# Patient Record
Sex: MALE | Race: WHITE | NOT HISPANIC OR LATINO | URBAN - METROPOLITAN AREA
[De-identification: names, ages, dates, MRNs, and addresses within clinical notes are randomized per-mention and may not be internally consistent; named-entity substitution may affect disease eponyms.]

---

## 2023-09-15 ENCOUNTER — TELEPHONE (OUTPATIENT)
Dept: FAMILY MEDICINE | Facility: CLINIC | Age: 83
End: 2023-09-15
Payer: MEDICARE

## 2023-09-15 NOTE — TELEPHONE ENCOUNTER
Order/Referral Request  Who is requesting: Patient is requesting    Orders being requested: Patient has physical scheduled for 10/03/2023 and would like to have any/all blood work completed ahead of time so that the results are back before his appointment with [facility].    When are orders needed by: 09/27/2023    Has this been discussed with Provider: No    Does patient have an appointment scheduled?: Yes: Patient has lab appt scheduled for 09/29/2023 @ 9:45am     Where to send orders: Place orders within Epic    Okay to leave a detailed message?: Yes at Cell number on file:    Telephone Information:   Mobile [phone]

## 2023-09-18 NOTE — TELEPHONE ENCOUNTER
Left detailed message. What labs is he requesting? Dr. Rivas may require him to be seen to order labs.

## 2023-09-19 ENCOUNTER — TELEPHONE (OUTPATIENT)
Dept: FAMILY MEDICINE | Facility: CLINIC | Age: 83
End: 2023-09-19
Payer: MEDICARE

## 2023-09-19 NOTE — TELEPHONE ENCOUNTER
Pt notified of the following [facility] statement: Please let patient know we can discuss these at his visit.  The practicality of the lab depends on the diagnosis and indication for test.  We will have to determine this during an appointment when we get a chance to know each other.     Pt states understanding.

## 2023-09-19 NOTE — TELEPHONE ENCOUNTER
"  RN spoke with patient.  Patient is requesting \"all labs that are practical\" to be drawn prior to visit.  (Patient does not have any specific lab request, just wanted to make sure he has as many as practical).    Patient is scheduled for:  Labs on 9/29/23  Physical on 10/3/2023    Please place orders for Labs.  "

## 2025-05-16 PROBLEM — D68.51 HETEROZYGOUS FACTOR V LEIDEN MUTATION: Status: ACTIVE | Noted: 2025-05-16

## 2025-05-16 PROBLEM — Z77.29 EXPOSURE TO POTENTIALLY HAZARDOUS SUBSTANCE: Status: ACTIVE | Noted: 2024-03-08

## 2025-05-16 PROBLEM — R97.20 HIGH PROSTATE SPECIFIC ANTIGEN (PSA): Status: ACTIVE | Noted: 2025-05-16

## 2025-05-16 PROBLEM — H25.811 COMBINED FORMS OF AGE-RELATED CATARACT OF RIGHT EYE: Status: ACTIVE | Noted: 2021-05-25

## 2025-05-16 PROBLEM — H90.3 SENSORINEURAL HEARING LOSS, BILATERAL: Status: ACTIVE | Noted: 2025-05-16

## 2025-05-16 PROBLEM — Z86.718 HISTORY OF DEEP VENOUS THROMBOSIS: Status: RESOLVED | Noted: 2021-05-20 | Resolved: 2025-05-16

## 2025-05-16 PROBLEM — H25.812 COMBINED FORMS OF AGE-RELATED CATARACT OF LEFT EYE: Status: ACTIVE | Noted: 2021-05-18

## 2025-05-16 PROBLEM — M48.061 SPINAL STENOSIS OF LUMBAR REGION: Status: RESOLVED | Noted: 2025-05-16 | Resolved: 2025-05-16

## 2025-05-16 PROBLEM — N52.9 MALE ERECTILE DYSFUNCTION, UNSPECIFIED: Status: ACTIVE | Noted: 2025-05-16

## 2025-05-16 PROCEDURE — G0103 PSA SCREENING: HCPCS | Performed by: STUDENT IN AN ORGANIZED HEALTH CARE EDUCATION/TRAINING PROGRAM

## 2025-05-16 PROCEDURE — 80061 LIPID PANEL: CPT | Performed by: STUDENT IN AN ORGANIZED HEALTH CARE EDUCATION/TRAINING PROGRAM

## 2025-05-16 PROCEDURE — 80053 COMPREHEN METABOLIC PANEL: CPT | Performed by: STUDENT IN AN ORGANIZED HEALTH CARE EDUCATION/TRAINING PROGRAM

## 2025-05-20 ENCOUNTER — TELEPHONE (OUTPATIENT)
Dept: FAMILY MEDICINE | Facility: CLINIC | Age: 85
End: 2025-05-20
Payer: COMMERCIAL

## 2025-05-20 ENCOUNTER — RESULTS FOLLOW-UP (OUTPATIENT)
Dept: FAMILY MEDICINE | Facility: CLINIC | Age: 85
End: 2025-05-20

## 2025-05-20 NOTE — TELEPHONE ENCOUNTER
Patient Returning Call    Reason for call:  pt states he has a missed call from Dr. Mercer    Information relayed to patient:  No msg found in chart what call would be about. Informed pt that msg would be routed for Dr. Mercer to know pt will keep phone on him so he does not miss return call from PCP    Patient has additional questions:  No      Okay to leave a detailed message?: Yes at Cell number on file:    Telephone Information:   Mobile [phone]

## 2025-05-20 NOTE — TELEPHONE ENCOUNTER
Called patient, see result note.  Reports improvement in his groin pain, thinks it is likely from riding on a bumpy tractor.  Will look into urology follow-up given elevated PSA, but notes that he had a biopsy before and there has been no issues/concern for malignancy.    Chasidy Mercer MD